# Patient Record
Sex: MALE | Race: WHITE | NOT HISPANIC OR LATINO | Employment: FULL TIME | URBAN - METROPOLITAN AREA
[De-identification: names, ages, dates, MRNs, and addresses within clinical notes are randomized per-mention and may not be internally consistent; named-entity substitution may affect disease eponyms.]

---

## 2018-05-21 ENCOUNTER — RECORDS - HEALTHEAST (OUTPATIENT)
Dept: LAB | Facility: CLINIC | Age: 43
End: 2018-05-21

## 2018-05-21 LAB
ALBUMIN SERPL-MCNC: 3.8 G/DL (ref 3.5–5)
ALT SERPL W P-5'-P-CCNC: 39 U/L (ref 0–45)
AST SERPL W P-5'-P-CCNC: 40 U/L (ref 0–40)
C REACTIVE PROTEIN LHE: <0.1 MG/DL (ref 0–0.8)
CREAT SERPL-MCNC: 0.69 MG/DL (ref 0.7–1.3)
ERYTHROCYTE [DISTWIDTH] IN BLOOD BY AUTOMATED COUNT: 12.8 % (ref 11–14.5)
ERYTHROCYTE [SEDIMENTATION RATE] IN BLOOD BY WESTERGREN METHOD: 5 MM/HR (ref 0–15)
GFR SERPL CREATININE-BSD FRML MDRD: >60 ML/MIN/1.73M2
HCT VFR BLD AUTO: 41.5 % (ref 40–54)
HGB BLD-MCNC: 14.2 G/DL (ref 14–18)
MCH RBC QN AUTO: 32.7 PG (ref 27–34)
MCHC RBC AUTO-ENTMCNC: 34.2 G/DL (ref 32–36)
MCV RBC AUTO: 96 FL (ref 80–100)
PLATELET # BLD AUTO: 183 THOU/UL (ref 140–440)
PMV BLD AUTO: 10.1 FL (ref 8.5–12.5)
RBC # BLD AUTO: 4.34 MILL/UL (ref 4.4–6.2)
URATE SERPL-MCNC: 6.9 MG/DL (ref 3–8)
WBC: 5.9 THOU/UL (ref 4–11)

## 2018-05-22 ENCOUNTER — RECORDS - HEALTHEAST (OUTPATIENT)
Dept: ADMINISTRATIVE | Facility: OTHER | Age: 43
End: 2018-05-22

## 2021-09-20 ENCOUNTER — OFFICE VISIT (OUTPATIENT)
Dept: FAMILY MEDICINE | Facility: CLINIC | Age: 46
End: 2021-09-20

## 2021-09-20 VITALS
HEART RATE: 75 BPM | OXYGEN SATURATION: 97 % | DIASTOLIC BLOOD PRESSURE: 88 MMHG | SYSTOLIC BLOOD PRESSURE: 124 MMHG | HEIGHT: 78 IN | WEIGHT: 254 LBS | RESPIRATION RATE: 22 BRPM | BODY MASS INDEX: 29.39 KG/M2

## 2021-09-20 DIAGNOSIS — M19.011 PRIMARY OSTEOARTHRITIS OF RIGHT SHOULDER: ICD-10-CM

## 2021-09-20 DIAGNOSIS — Z23 NEED FOR VACCINATION: ICD-10-CM

## 2021-09-20 DIAGNOSIS — Z11.59 ENCOUNTER FOR HEPATITIS C SCREENING TEST FOR LOW RISK PATIENT: ICD-10-CM

## 2021-09-20 DIAGNOSIS — Z00.00 ROUTINE GENERAL MEDICAL EXAMINATION AT A HEALTH CARE FACILITY: Primary | ICD-10-CM

## 2021-09-20 DIAGNOSIS — Z11.4 SCREENING FOR HIV WITHOUT PRESENCE OF RISK FACTORS: ICD-10-CM

## 2021-09-20 DIAGNOSIS — L70.0 ACNE VULGARIS: ICD-10-CM

## 2021-09-20 LAB
CHOLEST SERPL-MCNC: 171 MG/DL (ref 0–199)
CHOLEST/HDLC SERPL: 3 {RATIO} (ref 0–5)
GLUCOSE SERPL-MCNC: 91 MG/DL (ref 60–99)
HDLC SERPL-MCNC: 59 MG/DL (ref 40–150)
LDLC SERPL CALC-MCNC: 97 MG/DL (ref 0–130)
TRIGL SERPL-MCNC: 75 MG/DL (ref 0–149)

## 2021-09-20 PROCEDURE — 90471 IMMUNIZATION ADMIN: CPT | Performed by: STUDENT IN AN ORGANIZED HEALTH CARE EDUCATION/TRAINING PROGRAM

## 2021-09-20 PROCEDURE — 86803 HEPATITIS C AB TEST: CPT | Mod: 90 | Performed by: STUDENT IN AN ORGANIZED HEALTH CARE EDUCATION/TRAINING PROGRAM

## 2021-09-20 PROCEDURE — 82947 ASSAY GLUCOSE BLOOD QUANT: CPT | Performed by: STUDENT IN AN ORGANIZED HEALTH CARE EDUCATION/TRAINING PROGRAM

## 2021-09-20 PROCEDURE — 99386 PREV VISIT NEW AGE 40-64: CPT | Mod: 25 | Performed by: STUDENT IN AN ORGANIZED HEALTH CARE EDUCATION/TRAINING PROGRAM

## 2021-09-20 PROCEDURE — 87389 HIV-1 AG W/HIV-1&-2 AB AG IA: CPT | Mod: 90 | Performed by: STUDENT IN AN ORGANIZED HEALTH CARE EDUCATION/TRAINING PROGRAM

## 2021-09-20 PROCEDURE — 36415 COLL VENOUS BLD VENIPUNCTURE: CPT | Performed by: STUDENT IN AN ORGANIZED HEALTH CARE EDUCATION/TRAINING PROGRAM

## 2021-09-20 PROCEDURE — 80061 LIPID PANEL: CPT | Performed by: STUDENT IN AN ORGANIZED HEALTH CARE EDUCATION/TRAINING PROGRAM

## 2021-09-20 PROCEDURE — 90686 IIV4 VACC NO PRSV 0.5 ML IM: CPT | Performed by: STUDENT IN AN ORGANIZED HEALTH CARE EDUCATION/TRAINING PROGRAM

## 2021-09-20 RX ORDER — AMOXICILLIN 500 MG/1
CAPSULE ORAL
Qty: 60 CAPSULE | Refills: 3 | COMMUNITY
Start: 2021-09-20 | End: 2022-08-10

## 2021-09-20 RX ORDER — AMOXICILLIN 500 MG/1
CAPSULE ORAL
COMMUNITY
Start: 2021-05-03 | End: 2021-09-20

## 2021-09-20 ASSESSMENT — ANXIETY QUESTIONNAIRES
6. BECOMING EASILY ANNOYED OR IRRITABLE: NOT AT ALL
GAD7 TOTAL SCORE: 5
7. FEELING AFRAID AS IF SOMETHING AWFUL MIGHT HAPPEN: NOT AT ALL
5. BEING SO RESTLESS THAT IT IS HARD TO SIT STILL: SEVERAL DAYS
3. WORRYING TOO MUCH ABOUT DIFFERENT THINGS: SEVERAL DAYS
2. NOT BEING ABLE TO STOP OR CONTROL WORRYING: SEVERAL DAYS
1. FEELING NERVOUS, ANXIOUS, OR ON EDGE: SEVERAL DAYS
IF YOU CHECKED OFF ANY PROBLEMS ON THIS QUESTIONNAIRE, HOW DIFFICULT HAVE THESE PROBLEMS MADE IT FOR YOU TO DO YOUR WORK, TAKE CARE OF THINGS AT HOME, OR GET ALONG WITH OTHER PEOPLE: NOT DIFFICULT AT ALL

## 2021-09-20 ASSESSMENT — PATIENT HEALTH QUESTIONNAIRE - PHQ9
SUM OF ALL RESPONSES TO PHQ QUESTIONS 1-9: 6
5. POOR APPETITE OR OVEREATING: SEVERAL DAYS

## 2021-09-20 ASSESSMENT — MIFFLIN-ST. JEOR: SCORE: 2208.02

## 2021-09-20 NOTE — NURSING NOTE
Chief Complaint   Patient presents with     Physical     fasting, no concerns     Pre-Visit Screening:  Immunizations: Due for flu   Colonoscopy:   Mammogram:  Asthma Action Test/Plan:  PHQ9: done today  GAD7: done today  Questioned patient about current smoking habits Pt. never  OK to leave a detailed message on voice mail for today's visit YES, phone # [phone]  ACP discussed and given

## 2021-09-20 NOTE — PROGRESS NOTES
"  SUBJECTIVE:   CC: Francesco Ramos is an 45 year old male who presents for preventative health visit.       HPI  Good health overall  Less exercise than usual, looking forward to gym/basketball  Healthy diet  No longer taking allopurinol, no foot pain in years  No MH concerns    Today's PHQ-2 Score:   PHQ-2 ( 1999 Pfizer) 12/19/2013   Q1: Little interest or pleasure in doing things 0   Q2: Feeling down, depressed or hopeless 0   PHQ-2 Score 0       Social History     Tobacco Use     Smoking status: Never Smoker     Smokeless tobacco: Never Used   Substance Use Topics     Alcohol use: Yes     Alcohol/week: 14.0 standard drinks     Types: 14 Standard drinks or equivalent per week     Owns brewery, mindful of ETOH    Last PSA: No results found for: PSA    Reviewed orders with patient. Reviewed health maintenance and updated orders accordingly - Yes    Reviewed and updated as needed this visit by clinical staff  Tobacco  Allergies  Meds             Reviewed and updated as needed this visit by Provider                 Review of Systems  12 point ROS performed and negative for new concerns except as mentioned above     OBJECTIVE:   /88 (BP Location: Right arm, Patient Position: Sitting, Cuff Size: Adult Regular)   Pulse 75   Resp 22   Ht 2.057 m (6' 9\")   Wt 115.2 kg (254 lb)   SpO2 97%   BMI 27.22 kg/m      Physical Exam  GENERAL: healthy, alert and no distress  EYES: Eyes grossly normal to inspection, PERRL and conjunctivae and sclerae normal  HENT: ear canals and TM's normal, nose and mouth without ulcers or lesions  NECK: no adenopathy, no asymmetry, masses, or scars and thyroid normal to palpation  RESP: lungs clear to auscultation - no rales, rhonchi or wheezes  CV: regular rate and rhythm, normal S1 S2, no S3 or S4, no murmur, click or rub, no peripheral edema and peripheral pulses strong  ABDOMEN: soft, nontender, no hepatosplenomegaly, no masses and bowel sounds normal  MS: no gross " "musculoskeletal defects noted, no edema  SKIN: no suspicious lesions or rashes  NEURO: Normal strength and tone, mentation intact and speech normal  PSYCH: mentation appears normal, affect normal/bright    Diagnostic Test Results:  Labs reviewed in Epic    ASSESSMENT/PLAN:       ICD-10-CM    1. Routine general medical examination at a health care facility  Z00.00 Lipid Panel (BFP)     Glucose Fasting (BFP)   2. Need for vaccination  Z23 FLU VAC PRESRV FREE QUAD SPLIT VIR 3+YRS IM   3. Primary osteoarthritis of right shoulder  M19.011    4. Encounter for hepatitis C screening test for low risk patient  Z11.59 HEPATITIS C ANTIBODY ([facility])   5. Screening for HIV without presence of risk factors  Z11.4 HIV 1/2 Agn Arminda 4th Gen w Reflex ([facility])   6. Acne vulgaris  L70.0 amoxicillin (AMOXIL) 500 MG capsule       Patient has been advised of split billing requirements and indicates understanding: Yes  COUNSELING:   Reviewed preventive health counseling, and as reflected above       Regular exercise       Healthy diet/nutrition       Vision screening       Hearing screening       Immunizations       Alcohol Use        Safe sex practices/STD prevention       Colon cancer screening       Prostate cancer screening       Advance Care Planning    Estimated body mass index is 27.22 kg/m  as calculated from the following:    Height as of this encounter: 2.057 m (6' 9\").    Weight as of this encounter: 115.2 kg (254 lb).     He reports that he has never smoked. He has never used smokeless tobacco.      Anthony Ca MD, [facility]   "

## 2021-09-21 ASSESSMENT — ANXIETY QUESTIONNAIRES: GAD7 TOTAL SCORE: 5

## 2021-09-22 LAB
HCV AB - QUEST: NORMAL
HIV 1/2 AGN ABY 4TH GEN WITH REFLEX: NORMAL
SIGNAL TO CUT OFF - QUEST: 0.04

## 2022-05-13 ENCOUNTER — TRANSFERRED RECORDS (OUTPATIENT)
Dept: FAMILY MEDICINE | Facility: CLINIC | Age: 47
End: 2022-05-13

## 2022-08-10 DIAGNOSIS — L70.0 ACNE VULGARIS: ICD-10-CM

## 2022-08-10 RX ORDER — AMOXICILLIN 500 MG/1
CAPSULE ORAL
Qty: 60 CAPSULE | Refills: 0 | COMMUNITY
Start: 2022-08-10 | End: 2022-09-21

## 2022-08-10 NOTE — TELEPHONE ENCOUNTER
Pt is requesting a one month supply of amoxicillin to get him through until his cpx scheduled 2022.  I called in a one month supply to [facility].      Signed Prescriptions:                        Disp   Refills    amoxicillin (AMOXIL) 500 MG capsule        60 cap*0        Simg BID x 1 month, every 3rd month  Authorizing Provider: TIARA DUKES  Ordering User: MONICA DELGADILLO

## 2022-09-21 ENCOUNTER — OFFICE VISIT (OUTPATIENT)
Dept: FAMILY MEDICINE | Facility: CLINIC | Age: 47
End: 2022-09-21

## 2022-09-21 VITALS
DIASTOLIC BLOOD PRESSURE: 94 MMHG | OXYGEN SATURATION: 98 % | HEART RATE: 74 BPM | TEMPERATURE: 97.6 F | SYSTOLIC BLOOD PRESSURE: 128 MMHG | RESPIRATION RATE: 22 BRPM | BODY MASS INDEX: 30.2 KG/M2 | HEIGHT: 78 IN | WEIGHT: 261 LBS

## 2022-09-21 DIAGNOSIS — G40.909 NONINTRACTABLE EPILEPSY WITHOUT STATUS EPILEPTICUS, UNSPECIFIED EPILEPSY TYPE (H): ICD-10-CM

## 2022-09-21 DIAGNOSIS — Z23 NEED FOR VACCINATION: ICD-10-CM

## 2022-09-21 DIAGNOSIS — Z00.00 ROUTINE GENERAL MEDICAL EXAMINATION AT A HEALTH CARE FACILITY: Primary | ICD-10-CM

## 2022-09-21 DIAGNOSIS — R03.0 DIASTOLIC BLOOD PRESSURE 90 MM HG OR HIGHER: ICD-10-CM

## 2022-09-21 DIAGNOSIS — L70.0 ACNE VULGARIS: ICD-10-CM

## 2022-09-21 DIAGNOSIS — Z13.220 LIPID SCREENING: ICD-10-CM

## 2022-09-21 DIAGNOSIS — Z12.11 SPECIAL SCREENING FOR MALIGNANT NEOPLASMS, COLON: ICD-10-CM

## 2022-09-21 DIAGNOSIS — Z13.1 SCREENING FOR DIABETES MELLITUS: ICD-10-CM

## 2022-09-21 LAB
ALBUMIN SERPL-MCNC: 4.3 G/DL (ref 3.6–5.1)
ALBUMIN/GLOB SERPL: 1.5 {RATIO} (ref 1–2.5)
ALP SERPL-CCNC: 43 U/L (ref 33–130)
ALT 1742-6: 22 U/L (ref 0–32)
AST 1920-8: 16 U/L (ref 0–35)
BILIRUB SERPL-MCNC: 0.5 MG/DL (ref 0.2–1.2)
BUN SERPL-MCNC: 9 MG/DL (ref 7–25)
BUN/CREATININE RATIO: 11.5 (ref 6–22)
CALCIUM SERPL-MCNC: 9 MG/DL (ref 8.6–10.3)
CHLORIDE SERPLBLD-SCNC: 105.9 MMOL/L (ref 98–110)
CHOLEST SERPL-MCNC: 144 MG/DL (ref 0–199)
CHOLEST/HDLC SERPL: 2 {RATIO} (ref 0–5)
CO2 SERPL-SCNC: 26.6 MMOL/L (ref 20–32)
CREAT SERPL-MCNC: 0.78 MG/DL (ref 0.6–1.3)
GLOBULIN, CALCULATED - QUEST: 2.8 (ref 1.9–3.7)
GLUCOSE SERPL-MCNC: 84 MG/DL (ref 60–99)
HBA1C MFR BLD: 5 % (ref 4–7)
HDLC SERPL-MCNC: 58 MG/DL (ref 40–150)
LDLC SERPL CALC-MCNC: 73 MG/DL (ref 0–130)
POTASSIUM SERPL-SCNC: 4.88 MMOL/L (ref 3.5–5.3)
PROT SERPL-MCNC: 7.1 G/DL (ref 6.1–8.1)
SODIUM SERPL-SCNC: 140.8 MMOL/L (ref 135–146)
TRIGL SERPL-MCNC: 63 MG/DL (ref 0–149)

## 2022-09-21 PROCEDURE — 99396 PREV VISIT EST AGE 40-64: CPT | Mod: 25 | Performed by: STUDENT IN AN ORGANIZED HEALTH CARE EDUCATION/TRAINING PROGRAM

## 2022-09-21 PROCEDURE — 80061 LIPID PANEL: CPT | Performed by: STUDENT IN AN ORGANIZED HEALTH CARE EDUCATION/TRAINING PROGRAM

## 2022-09-21 PROCEDURE — 90471 IMMUNIZATION ADMIN: CPT | Performed by: STUDENT IN AN ORGANIZED HEALTH CARE EDUCATION/TRAINING PROGRAM

## 2022-09-21 PROCEDURE — 84443 ASSAY THYROID STIM HORMONE: CPT | Mod: 90 | Performed by: STUDENT IN AN ORGANIZED HEALTH CARE EDUCATION/TRAINING PROGRAM

## 2022-09-21 PROCEDURE — 80053 COMPREHEN METABOLIC PANEL: CPT | Performed by: STUDENT IN AN ORGANIZED HEALTH CARE EDUCATION/TRAINING PROGRAM

## 2022-09-21 PROCEDURE — 83036 HEMOGLOBIN GLYCOSYLATED A1C: CPT | Performed by: STUDENT IN AN ORGANIZED HEALTH CARE EDUCATION/TRAINING PROGRAM

## 2022-09-21 PROCEDURE — 90686 IIV4 VACC NO PRSV 0.5 ML IM: CPT | Performed by: STUDENT IN AN ORGANIZED HEALTH CARE EDUCATION/TRAINING PROGRAM

## 2022-09-21 RX ORDER — AMOXICILLIN 500 MG/1
CAPSULE ORAL
Qty: 60 CAPSULE | Refills: 3 | Status: SHIPPED | OUTPATIENT
Start: 2022-09-21 | End: 2023-12-11

## 2022-09-21 NOTE — NURSING NOTE
Chief Complaint   Patient presents with     Physical     Annual, fasting      Pre-visit Screening:  Immunizations:  up to date  Colonoscopy:  is due and ordered today  Mammogram: NA  Asthma Action Test/Plan:  NA  PHQ9:  PHQ2 done today   GAD7:  No concerns  Questioned patient about current smoking habits Pt. has never smoked.  Ok to leave detailed message on voice mail for today's visit only Yes, phone # [phone]

## 2022-09-21 NOTE — PROGRESS NOTES
3  SUBJECTIVE:   CC: Francesco Ramos is an 46 year old male who presents for preventive health visit.     Patient has been advised of split billing requirements and indicates understanding: Yes  Healthy Habits:  General health: pretty good  Diet: good  Exercise: active  Sleep: no concerns     Mental Health: no concerns        Problems taking medications regularly No    Medication side effects: No    Have you had an eye exam in the past two years? no    Do you see a dentist twice per year? yes    Today's PHQ-2 Score:   PHQ-2 ( 1999 Pfizer) 9/21/2022 12/19/2013   Q1: Little interest or pleasure in doing things 0 0   Q2: Feeling down, depressed or hopeless 0 0   PHQ-2 Score 0 0     Do you feel safe in your environment? Yes      Social History     Tobacco Use     Smoking status: Never Smoker     Smokeless tobacco: Never Used   Substance Use Topics     Alcohol use: Yes     Alcohol/week: 14.0 standard drinks     Types: 14 Standard drinks or equivalent per week     Comment: 14 beers a week                         Last PSA: No results found for: PSA    Reviewed orders with patient. Reviewed health maintenance and updated orders accordingly - Yes  Labs reviewed in EPIC  BP Readings from Last 3 Encounters:   09/21/22 (!) 128/94   09/20/21 124/88   03/19/15 (!) 148/94    Wt Readings from Last 3 Encounters:   09/21/22 118.4 kg (261 lb)   09/20/21 115.2 kg (254 lb)   03/19/15 123.3 kg (271 lb 12.8 oz)            Reviewed and updated as needed this visit by clinical staff   Tobacco  Allergies  Meds              Current Outpatient Medications   Medication     amoxicillin (AMOXIL) 500 MG capsule     GLUCOSAMINE CHONDR 500 COMPLEX OR CAPS     KEPPRA 500 MG OR TABS     MULTI-VITAMIN OR TABS     No current facility-administered medications for this visit.       Reviewed and updated as needed this visit by Provider                   Past Medical History:   Diagnosis Date     Chronic idiopathic gout of foot     questionable  "diagnosis?     Epilepsy seizure, generalized, convulsive (H) 01/01/2003      Past Surgical History:   Procedure Laterality Date     ANKLE SURGERY Left     lateral ligament reconstruction     Family History   Problem Relation Age of Onset     Skin Cancer Father      Cerebrovascular Disease Maternal Grandfather      Thyroid Disease No family hx of      Prostate Cancer No family hx of      Mental Illness No family hx of      Diabetes No family hx of      Coronary Artery Disease No family hx of      Colon Cancer No family hx of      Breast Cancer No family hx of         ROS:  12 point ROS performed and negative for new concerns except as mentioned above     OBJECTIVE:   BP (!) 128/94 (BP Location: Right arm, Patient Position: Sitting, Cuff Size: Adult Large)   Pulse 74   Temp 97.6  F (36.4  C) (Temporal)   Resp 22   Ht 2.057 m (6' 9\")   Wt 118.4 kg (261 lb)   SpO2 98%   BMI 27.97 kg/m    EXAM:  GENERAL: healthy, alert and no distress  EYES: Eyes grossly normal to inspection, PERRL and conjunctivae and sclerae normal  HENT: ear canals and TM's normal, nose and mouth without ulcers or lesions  NECK: no adenopathy, no asymmetry, masses, or scars and thyroid normal to palpation  RESP: lungs clear to auscultation - no rales, rhonchi or wheezes  CV: regular rate and rhythm, normal S1 S2, no S3 or S4, no murmur, click or rub, no peripheral edema and peripheral pulses strong  ABDOMEN: soft, nontender, no hepatosplenomegaly, no masses and bowel sounds normal  MS: no gross musculoskeletal defects noted, no edema  SKIN: no suspicious lesions or rashes  NEURO: Normal strength and tone, mentation intact and speech normal  PSYCH: mentation appears normal, affect normal/bright    Diagnostic Test Results:  Labs reviewed in Epic    ASSESSMENT/PLAN:       ICD-10-CM    1. Routine general medical examination at a health care facility  Z00.00    2. Special screening for malignant neoplasms, colon  Z12.11 Colonoscopy Screening " " Referral     CANCELED: Colonoscopy Screening  Referral   3. Diastolic blood pressure 90 mm Hg or higher  R03.0 Comprehensive Metobolic Panel (BFP)     TSH WITH FREE T4 REFLEX ([facility])   4. Nonintractable epilepsy without status epilepticus, unspecified epilepsy type (H)  G40.909 Comprehensive Metobolic Panel (BFP)   5. Lipid screening  Z13.220 Lipid Panel (BFP)   6. Screening for diabetes mellitus  Z13.1 HEMOGLOBIN A1C (BFP)   7. Acne vulgaris  L70.0 amoxicillin (AMOXIL) 500 MG capsule   8. Need for vaccination  Z23 FLU VAC PRESRV FREE QUAD SPLIT VIR 3+YRS IM     Patient has been advised of split billing requirements and indicates understanding: Yes     COUNSELING:  Reviewed preventive health counseling, as reflected in patient instructions       Regular exercise       Healthy diet/nutrition       Vision screening       Hearing screening       Immunizations       Alcohol Use        Colorectal cancer screening       Prostate cancer screening       Advance Care Planning    Estimated body mass index is 27.97 kg/m  as calculated from the following:    Height as of this encounter: 2.057 m (6' 9\").    Weight as of this encounter: 118.4 kg (261 lb).    Weight management plan: Discussed healthy diet and exercise guidelines    He reports that he has never smoked. He has never used smokeless tobacco.    Patient Instructions   Labs and flu shot today    Schedule eye exam - look at insurance network coverage    Schedule colonoscopy at [facility] (usually best covered if their doctor at [facility] facility)    Consider omicron covid booster    Consider getting BP cuff at pharmacy and measuring at different times of day     Anthony Ca MD, [facility]   "

## 2022-09-21 NOTE — PATIENT INSTRUCTIONS
Labs and flu shot today    Schedule eye exam - look at insurance network coverage    Schedule colonoscopy at [facility] (usually best covered if their doctor at [facility] facility)    Consider omicron covid booster    Consider getting BP cuff at pharmacy and measuring at different times of day

## 2022-09-22 LAB — TSH SERPL-ACNC: 1.51 MIU/L (ref 0.4–4.5)

## 2022-10-05 ENCOUNTER — TRANSFERRED RECORDS (OUTPATIENT)
Dept: FAMILY MEDICINE | Facility: CLINIC | Age: 47
End: 2022-10-05

## 2023-03-13 ENCOUNTER — TRANSFERRED RECORDS (OUTPATIENT)
Dept: FAMILY MEDICINE | Facility: CLINIC | Age: 48
End: 2023-03-13

## 2023-03-20 ENCOUNTER — TRANSFERRED RECORDS (OUTPATIENT)
Dept: FAMILY MEDICINE | Facility: CLINIC | Age: 48
End: 2023-03-20

## 2023-05-30 ENCOUNTER — TRANSFERRED RECORDS (OUTPATIENT)
Dept: FAMILY MEDICINE | Facility: CLINIC | Age: 48
End: 2023-05-30

## 2023-06-06 ENCOUNTER — OFFICE VISIT (OUTPATIENT)
Dept: FAMILY MEDICINE | Facility: CLINIC | Age: 48
End: 2023-06-06

## 2023-06-06 VITALS
HEIGHT: 78 IN | OXYGEN SATURATION: 98 % | WEIGHT: 264 LBS | DIASTOLIC BLOOD PRESSURE: 84 MMHG | HEART RATE: 68 BPM | TEMPERATURE: 98.6 F | SYSTOLIC BLOOD PRESSURE: 130 MMHG | BODY MASS INDEX: 30.55 KG/M2

## 2023-06-06 DIAGNOSIS — Z71.89 ACP (ADVANCE CARE PLANNING): ICD-10-CM

## 2023-06-06 DIAGNOSIS — Z76.89 HEALTH CARE HOME: ICD-10-CM

## 2023-06-06 DIAGNOSIS — Z01.818 PRE-OP EXAM: Primary | ICD-10-CM

## 2023-06-06 DIAGNOSIS — S46.002A ROTATOR CUFF INJURY, LEFT, INITIAL ENCOUNTER: ICD-10-CM

## 2023-06-06 PROBLEM — G40.209 COMPLEX PARTIAL EPILEPTIC SEIZURE (H): Status: ACTIVE | Noted: 2023-06-06

## 2023-06-06 PROCEDURE — 99214 OFFICE O/P EST MOD 30 MIN: CPT | Performed by: PHYSICIAN ASSISTANT

## 2023-06-06 NOTE — NURSING NOTE
Chief Complaint   Patient presents with     Pre-Op Exam     Surgery/Procedure: Right shoulder arthroscopy   Surgery Location: [facility]   Surgeon: Dr. Saunders  Surgery Date: 06/15/23

## 2023-06-06 NOTE — PROGRESS NOTES
[facility]  [address] [place] [address]  [address]  [facility] [address]  Phone: [phone]  Fax: [phone]  Primary Provider: Anthony Ca  Pre-op Performing Provider: JONO TOBIN      PREOPERATIVE EVALUATION:  Today's date: 2023    Francesco Ramos is a 47 year old male who presents for a preoperative evaluation. ( 71)    Surgical Information:  Surgery/Procedure: Right shoulder arthroscopy   Surgery Location: [facility]   Surgeon: Dr. Saunders  Surgery Date: 06/15/23  Time of Surgery: TBD  Where patient plans to recover: At home with family  Fax number for surgical facility:     Assessment & Plan     The proposed surgical procedure is considered INTERMEDIATE risk.    ACP (advance care planning)      Health Care Home      Pre-op exam  Proceed with surgery at surgeon's discretion.      Rotator cuff injury, left, initial encounter        Risks and Recommendations:  The patient has the following additional risks and recommendations for perioperative complications:   - No identified additional risk factors other than previously addressed    Antiplatelet or Anticoagulation Medication Instructions:   - Patient is on no antiplatelet or anticoagulation medications.    Additional Medication Instructions:  Pt will contact neurology to confirm no change to dosing. No other meds    RECOMMENDATION:  APPROVAL GIVEN to proceed with proposed procedure, without further diagnostic evaluation.      Subjective       HPI related to upcoming procedure: L shoulder injury, arthritis and rotator cuff injury    1. No - Have you ever had a heart attack or stroke?  2. No - Have you ever had surgery on your heart or blood vessels, such as a stent, coronary (heart) bypass, or surgery on an artery in the head, neck, heart, or legs?  3. No - Do you have chest pain when you are physically active?  4. No - Do you have a history of heart failure?  5. No - Do you currently have a cold,  bronchitis, or symptoms of other respiratory (head and chest) infections?  6. No - Do you have a cough, shortness of breath, or wheezing?  7. Yes - Do you or anyone in your family have a history of blood clots? Brother had blood clot-DVT  8. No - Do you or anyone in your family have a serious bleeding problem, such as long-lasting bleeding after surgeries or cuts?  9. No - Have you ever had anemia or been told to take iron pills?  10. No - Have you had any abnormal blood loss such as black, tarry or bloody stools, or abnormal vaginal bleeding?  11. No - Have you ever had a blood transfusion?  12. Yes - Are you willing to have a blood transfusion if it is medically needed before, during, or after your surgery?  13. Yes - Have you or anyone in your family ever had problems with anesthesia (sedation for surgery)? Father did not react well, unknown specific reaction  14. No - Do you have sleep apnea, excessive snoring, or daytime drowsiness?   15. No - Do you have any artifical heart valves or other implanted medical devices, such as a pacemaker, defibrillator, or continuous glucose monitor?  16. No - Do you have any artifical joints?  17. No - Are you allergic to latex?    Health Care Directive:  Patient does not have a Health Care Directive or Living Will: Discussed advance care planning with patient; information given to patient to review.    Preoperative Review of :   reviewed - no record of controlled substances prescribed.      Status of Chronic Conditions:  Epilepsy: well controlled on Keppra. Last seizure 2007.    Review of Systems  CONSTITUTIONAL: NEGATIVE for fever, chills, change in weight  INTEGUMENTARY/SKIN: NEGATIVE for worrisome rashes, moles or lesions  EYES: NEGATIVE for vision changes or irritation  ENT/MOUTH: NEGATIVE for ear, mouth and throat problems  RESP: NEGATIVE for significant cough or SOB  CV: NEGATIVE for chest pain, palpitations or peripheral edema  GI: NEGATIVE for nausea, abdominal  pain, heartburn, or change in bowel habits  : NEGATIVE for frequency, dysuria, or hematuria  NEURO: NEGATIVE for weakness, dizziness or paresthesias  ENDOCRINE: NEGATIVE for temperature intolerance, skin/hair changes  HEME: NEGATIVE for bleeding problems  PSYCHIATRIC: NEGATIVE for changes in mood or affect    Patient Active Problem List    Diagnosis Date Noted     Complex partial epileptic seizure (H) 06/06/2023     Priority: Medium     Nonintractable epilepsy without status epilepticus, unspecified epilepsy type (H) 09/21/2022     Priority: Medium     CARDIOVASCULAR SCREENING; LDL GOAL LESS THAN 160 08/17/2011     Priority: Medium     Ankle joint pain 02/24/2011     Priority: Medium     Tibialis posterior tendonitis 02/24/2011     Priority: Medium     Peroneal tendon injury 02/24/2011     Priority: Medium     Enthesopathy of ankle and tarsus 02/24/2011     Priority: Medium     Problem list name updated by automated process. Provider to review       Pes planus 01/03/2011     Priority: Medium     Problem list name updated by automated process. Provider to review       ACP (advance care planning) 06/06/2023     Priority: Low     Health Care Home 06/06/2023     Priority: Low      Past Medical History:   Diagnosis Date     Chronic idiopathic gout of foot     questionable diagnosis?     Epilepsy seizure, generalized, convulsive (H) 01/01/2003     Past Surgical History:   Procedure Laterality Date     ANKLE SURGERY Left     lateral ligament reconstruction     Current Outpatient Medications   Medication Sig Dispense Refill     amoxicillin (AMOXIL) 500 MG capsule 500mg BID x 1 month, every 3rd month 60 capsule 3     KEPPRA 500 MG OR TABS 1 TABLET TWICE DAILY 60 0       Allergies   Allergen Reactions     Nkda [No Known Drug Allergy]         Social History     Tobacco Use     Smoking status: Never     Passive exposure: Never     Smokeless tobacco: Never   Vaping Use     Vaping status: Not on file   Substance Use Topics      "Alcohol use: Yes     Alcohol/week: 14.0 standard drinks of alcohol     Types: 14 Standard drinks or equivalent per week     Comment: 14 beers a week       History   Drug Use Unknown         Objective     /84 (BP Location: Right arm, Patient Position: Sitting, Cuff Size: Adult Large)   Pulse 68   Temp 98.6  F (37  C) (Temporal)   Ht 2.032 m (6' 8\")   Wt 119.7 kg (264 lb)   SpO2 98%   BMI 29.00 kg/m      Physical Exam    GENERAL APPEARANCE: healthy, alert and no distress     EYES: EOMI,  PERRL     HENT: ear canals and TM's normal and nose and mouth without ulcers or lesions     NECK: no adenopathy, no asymmetry, masses, or scars and thyroid normal to palpation     RESP: lungs clear to auscultation - no rales, rhonchi or wheezes     CV: regular rates and rhythm, normal S1 S2, no S3 or S4 and no murmur, click or rub     ABDOMEN:  soft, nontender, no HSM or masses and bowel sounds normal     MS: extremities normal- no gross deformities noted, no evidence of inflammation in joints, FROM in all extremities.     SKIN: no suspicious lesions or rashes     NEURO: Normal strength and tone, sensory exam grossly normal, mentation intact and speech normal     PSYCH: mentation appears normal. and affect normal/bright     LYMPHATICS: No cervical adenopathy    Recent Labs   Lab Test 09/21/22  1006 09/21/22  0000   NA  --  140.8   POTASSIUM  --  4.88   CR  --  0.78   A1C 5.0  --         Diagnostics:  No labs were ordered during this visit.   No EKG required, no history of coronary heart disease, significant arrhythmia, peripheral arterial disease or other structural heart disease.    Revised Cardiac Risk Index (RCRI):  The patient has the following serious cardiovascular risks for perioperative complications:   - No serious cardiac risks = 0 points     RCRI Interpretation: 0 points: Class I (very low risk - 0.4% complication rate)           Signed Electronically by: Carlin Her PA-C  Copy of this evaluation report is " provided to requesting physician.

## 2023-06-12 ENCOUNTER — TRANSFERRED RECORDS (OUTPATIENT)
Dept: FAMILY MEDICINE | Facility: CLINIC | Age: 48
End: 2023-06-12

## 2023-06-28 ENCOUNTER — TRANSFERRED RECORDS (OUTPATIENT)
Dept: FAMILY MEDICINE | Facility: CLINIC | Age: 48
End: 2023-06-28

## 2023-07-04 PROCEDURE — 12052 INTMD RPR FACE/MM 2.6-5.0 CM: CPT

## 2023-07-04 PROCEDURE — 99284 EMERGENCY DEPT VISIT MOD MDM: CPT | Mod: 25

## 2023-07-05 ENCOUNTER — APPOINTMENT (OUTPATIENT)
Dept: CT IMAGING | Facility: CLINIC | Age: 48
End: 2023-07-05
Attending: EMERGENCY MEDICINE
Payer: COMMERCIAL

## 2023-07-05 ENCOUNTER — HOSPITAL ENCOUNTER (EMERGENCY)
Facility: CLINIC | Age: 48
Discharge: HOME | End: 2023-07-05
Attending: EMERGENCY MEDICINE | Admitting: EMERGENCY MEDICINE
Payer: COMMERCIAL

## 2023-07-05 VITALS
OXYGEN SATURATION: 98 % | DIASTOLIC BLOOD PRESSURE: 77 MMHG | SYSTOLIC BLOOD PRESSURE: 108 MMHG | TEMPERATURE: 98.1 F | RESPIRATION RATE: 20 BRPM | HEART RATE: 106 BPM

## 2023-07-05 DIAGNOSIS — S02.2XXA CLOSED FRACTURE OF NASAL BONE, INITIAL ENCOUNTER: ICD-10-CM

## 2023-07-05 DIAGNOSIS — S01.81XA FACIAL LACERATION, INITIAL ENCOUNTER: ICD-10-CM

## 2023-07-05 PROCEDURE — 250N000009 HC RX 250: Performed by: EMERGENCY MEDICINE

## 2023-07-05 PROCEDURE — 70486 CT MAXILLOFACIAL W/O DYE: CPT

## 2023-07-05 PROCEDURE — 70450 CT HEAD/BRAIN W/O DYE: CPT

## 2023-07-05 RX ADMIN — Medication 3 ML: at 00:30

## 2023-07-05 RX ADMIN — Medication 3 ML: at 01:10

## 2023-07-05 ASSESSMENT — ACTIVITIES OF DAILY LIVING (ADL): ADLS_ACUITY_SCORE: 33

## 2023-07-05 NOTE — ED PROVIDER NOTES
History     Chief Complaint:  Fall       The history is provided by the patient.      Francesco Ramos is a 47 year old male who presents with a mechanical fall. About 30 minutes ago, he was taking out the trash when he tripped and hit his forehead on the ground. He did not lose consciousness at the time. He now has a large laceration to the right eye brow, and the bleeding was controlled in triage. At bedside, he endorses drinking heavily this past evening. He denies neck pain or back pain.     Independent Historian:    None    Review of External Notes:  I read the [facility] which reveals that his last Tetanus booster was 2018.      Medications:    Amoxil   Keppra     Past Medical History:    Chronic idiopathic gout   Epilepsy seizures     Past Surgical History:    Lateral ligament reconstruction     Physical Exam     Patient Vitals for the past 24 hrs:   BP Temp Temp src Pulse Resp SpO2   07/04/23 2359 108/77 98.1  F (36.7  C) Oral 106 20 98 %        Physical Exam  General: Appears well-developed and well-nourished.   Head: Laceration to right eyebrow and contusion to face.    Nose:  Nasal swelling present.  No bleeding or septal hematoma.   Mouth/Throat: Oropharynx is clear and moist.   Eyes: Conjunctivae are normal. Pupils are equal, round, and reactive to light.   Neck: Normal range of motion. No midline tenderness.  CV: Normal rate and regular rhythm.    Resp: Effort normal and breath sounds normal. No respiratory distress.   MSK: Normal range of motion. no edema. No Calf tenderness.  Neuro: The patient is alert and oriented. Speech normal.  Skin: Skin is warm and dry. No rash noted.   Psych: normal mood and affect. behavior is normal.       Emergency Department Course   Imaging:  Head CT w/o contrast  Final Result  IMPRESSION:  HEAD CT:  1.  No acute intracranial process.    FACIAL BONE CT:  1.  Comminuted nasal bone fractures with overlying soft tissue swelling  CT Facial Bones without Contrast  Final  Result  IMPRESSION:  HEAD CT:  1.  No acute intracranial process.  FACIAL BONE CT:  1.  Comminuted nasal bone fractures with overlying soft tissue swelling    Report per radiology    Procedures     Laceration Repair      LACERATION:  A simple clean 2.5 cm laceration.    LOCATION:  Right eye brow.    FUNCTION:  Distally sensation, circulation, motor and tendon function are intact.    ANESTHESIA:  LET - Topical.    PREPARATION:  Irrigation with Shur Clens.    DEBRIDEMENT:  no debridement.    CLOSURE:  Wound was closed with Two Layers: Subcutaneous layer closed with 2 x 5.0 Vicryl Sutures. Skin closed with 6 5.0 Fast Absorbing Gut using interrupted sutures.    Emergency Department Course & Assessments:    Interventions:  0030 LET 3 ml Topical   0110 LET 3 ml Topical     Assessments:  0009 I obtained history and examined the patient as noted above.  0143 I rechecked the patient and a laceration repair was performed as outlined in the procedure note above. The patient tolerated well and there were no complications. I discussed plan for discharge home.    Independent Interpretation (X-rays, CTs, rhythm strip):  On my independent interpretation of head CT there is no large ICH noted      Consultations/Discussion of Management or Tests:  None    Social Determinants of Health affecting care:  None     Disposition:  The patient was discharged to home.     Impression & Plan    Medical Decision Making:  Francesco Ramos presents after a fall.  He hit his face on the ground and denies an LOC.  He does endorse alcohol use tonight.  On evaluation he did have a fairly deep laceration to the right eyebrow.  CT was obtained that did show a nasal bone fracture.  No other fracture noted.  Laceration was cleaned and repaired and CT findings were discussed.  Pt discharged home with recommendation to monitor for signs of infection and discussed follow up with ENT.    Diagnosis:    ICD-10-CM    1. Facial laceration, initial encounter   S01.81XA       2. Closed fracture of nasal bone, initial encounter  S02.2XXA            Scribe Disclosure:  I, Ren Arnold, am serving as a scribe at 12:20 AM on 7/5/2023 to document services personally performed by Rush Hernandez MD based on my observations and the provider's statements to me.  7/5/2023   Rush Hernandez MD Bergenstal, John A, MD  07/08/23 1454

## 2023-07-05 NOTE — ED TRIAGE NOTES
Patient coming in with large forehead gouge to right eyebrow. Patient states he tripped while taking out the garbage. Denies LOC, neck and back pain. Recent shoulder surgery. Patient states he has been drinking heavily this evening. Bleeding controlled in triage.     Triage Assessment       Row Name 07/05/23 0000       Triage Assessment (Adult)    Airway WDL WDL       Respiratory WDL    Respiratory WDL WDL       Skin Circulation/Temperature WDL    Skin Circulation/Temperature WDL WDL       Cardiac WDL    Cardiac WDL WDL       Peripheral/Neurovascular WDL    Peripheral Neurovascular WDL WDL       Cognitive/Neuro/Behavioral WDL    Cognitive/Neuro/Behavioral WDL WDL

## 2023-08-03 ENCOUNTER — TRANSFERRED RECORDS (OUTPATIENT)
Dept: FAMILY MEDICINE | Facility: CLINIC | Age: 48
End: 2023-08-03

## 2023-08-24 ENCOUNTER — TRANSFERRED RECORDS (OUTPATIENT)
Dept: FAMILY MEDICINE | Facility: CLINIC | Age: 48
End: 2023-08-24

## 2023-10-29 ENCOUNTER — HEALTH MAINTENANCE LETTER (OUTPATIENT)
Age: 48
End: 2023-10-29

## 2023-12-11 ENCOUNTER — TRANSFERRED RECORDS (OUTPATIENT)
Dept: FAMILY MEDICINE | Facility: CLINIC | Age: 48
End: 2023-12-11

## 2023-12-11 ENCOUNTER — OFFICE VISIT (OUTPATIENT)
Dept: FAMILY MEDICINE | Facility: CLINIC | Age: 48
End: 2023-12-11

## 2023-12-11 VITALS
SYSTOLIC BLOOD PRESSURE: 120 MMHG | RESPIRATION RATE: 20 BRPM | BODY MASS INDEX: 31.35 KG/M2 | WEIGHT: 271 LBS | HEIGHT: 78 IN | DIASTOLIC BLOOD PRESSURE: 90 MMHG | OXYGEN SATURATION: 97 % | HEART RATE: 84 BPM

## 2023-12-11 DIAGNOSIS — Z12.5 PROSTATE CANCER SCREENING: ICD-10-CM

## 2023-12-11 DIAGNOSIS — Z13.220 LIPID SCREENING: ICD-10-CM

## 2023-12-11 DIAGNOSIS — Z00.00 ROUTINE GENERAL MEDICAL EXAMINATION AT A HEALTH CARE FACILITY: ICD-10-CM

## 2023-12-11 DIAGNOSIS — Z00.00 ROUTINE PHYSICAL EXAMINATION: Primary | ICD-10-CM

## 2023-12-11 DIAGNOSIS — L70.0 ACNE VULGARIS: ICD-10-CM

## 2023-12-11 DIAGNOSIS — Z83.2 FAMILY HISTORY OF FACTOR V LEIDEN MUTATION: ICD-10-CM

## 2023-12-11 DIAGNOSIS — G40.909 NONINTRACTABLE EPILEPSY WITHOUT STATUS EPILEPTICUS, UNSPECIFIED EPILEPSY TYPE (H): ICD-10-CM

## 2023-12-11 DIAGNOSIS — Z13.1 SCREENING FOR DIABETES MELLITUS: ICD-10-CM

## 2023-12-11 DIAGNOSIS — Z23 NEED FOR VACCINATION: ICD-10-CM

## 2023-12-11 LAB
BUN SERPL-MCNC: 10 MG/DL (ref 7–25)
BUN/CREATININE RATIO: 11.8 (ref 6–32)
CALCIUM SERPL-MCNC: 9.5 MG/DL (ref 8.6–10.3)
CHLORIDE SERPLBLD-SCNC: 103.5 MMOL/L (ref 98–110)
CHOLEST SERPL-MCNC: 201 MG/DL (ref 0–199)
CHOLEST/HDLC SERPL: 4 {RATIO} (ref 0–5)
CO2 SERPL-SCNC: 24.4 MMOL/L (ref 20–32)
CREAT SERPL-MCNC: 0.85 MG/DL (ref 0.6–1.3)
GLUCOSE SERPL-MCNC: 86 MG/DL (ref 60–99)
HDLC SERPL-MCNC: 56 MG/DL (ref 40–150)
LDLC SERPL CALC-MCNC: 105 MG/DL (ref 0–130)
POTASSIUM SERPL-SCNC: 4.55 MMOL/L (ref 3.5–5.3)
SODIUM SERPL-SCNC: 138.2 MMOL/L (ref 135–146)
TRIGL SERPL-MCNC: 202 MG/DL (ref 0–149)

## 2023-12-11 PROCEDURE — 84153 ASSAY OF PSA TOTAL: CPT | Mod: 90 | Performed by: STUDENT IN AN ORGANIZED HEALTH CARE EDUCATION/TRAINING PROGRAM

## 2023-12-11 PROCEDURE — 90471 IMMUNIZATION ADMIN: CPT | Performed by: STUDENT IN AN ORGANIZED HEALTH CARE EDUCATION/TRAINING PROGRAM

## 2023-12-11 PROCEDURE — 36415 COLL VENOUS BLD VENIPUNCTURE: CPT | Performed by: STUDENT IN AN ORGANIZED HEALTH CARE EDUCATION/TRAINING PROGRAM

## 2023-12-11 PROCEDURE — 80048 BASIC METABOLIC PNL TOTAL CA: CPT | Performed by: STUDENT IN AN ORGANIZED HEALTH CARE EDUCATION/TRAINING PROGRAM

## 2023-12-11 PROCEDURE — 90686 IIV4 VACC NO PRSV 0.5 ML IM: CPT | Performed by: STUDENT IN AN ORGANIZED HEALTH CARE EDUCATION/TRAINING PROGRAM

## 2023-12-11 PROCEDURE — 80061 LIPID PANEL: CPT | Performed by: STUDENT IN AN ORGANIZED HEALTH CARE EDUCATION/TRAINING PROGRAM

## 2023-12-11 PROCEDURE — 99396 PREV VISIT EST AGE 40-64: CPT | Mod: 25 | Performed by: STUDENT IN AN ORGANIZED HEALTH CARE EDUCATION/TRAINING PROGRAM

## 2023-12-11 RX ORDER — AMOXICILLIN 500 MG/1
CAPSULE ORAL
Qty: 60 CAPSULE | Refills: 3 | Status: SHIPPED | OUTPATIENT
Start: 2023-12-11

## 2023-12-11 NOTE — PROGRESS NOTES
"  SUBJECTIVE:   CC: Francesco Ramos is an 48 year old male who presents for preventive health visit.     Patient has been advised of split billing requirements and indicates understanding: Yes    Nursing Notes:   Preeti Jiang CMA  12/11/2023 12:11 PM  Signed  Chief Complaint   Patient presents with    Physical     Annual, fasting      Pre-visit Screening:  Immunizations:  up to date-will get flu shot today  Colonoscopy:  is up to date  Mammogram: na  Asthma Action Test/Plan:  na  PHQ9:  PHQ2 done today   GAD7:  na  Questioned patient about current smoking habits Pt. has never smoked.  Ok to leave detailed message on voice mail for today's visit only yes, phone # [phone] (home)        Healthy Habits:  General health: doing well, shoulder surgery this year  Diet: good  Exercise: as shoulder recovers getting active again  Sleep: no concerns   Mental Health: no concerns      Problems taking medications regularly No  Medication side effects: No  Have you had an eye exam in the past two years? Needs to  Do you see a dentist twice per year? yes  Do you have sleep apnea, excessive snoring or daytime drowsiness?no      Today's PHQ-2 Score:       12/11/2023    12:09 PM 9/21/2022     9:13 AM   PHQ-2 ( 1999 [employer])   Q1: Little interest or pleasure in doing things 0 0   Q2: Feeling down, depressed or hopeless 0 0   PHQ-2 Score 0 0       Do you feel safe in your environment? Yes        Social History     Tobacco Use    Smoking status: Never     Passive exposure: Never    Smokeless tobacco: Never   Substance Use Topics    Alcohol use: Not Currently     Alcohol/week: 4.0 standard drinks of alcohol     Types: 4 Standard drinks or equivalent per week     Comment: 3 times per week                         Last PSA: No results found for: \"PSA\"    Reviewed orders with patient. Reviewed health maintenance and updated orders accordingly - Yes  Lab work is in process  Labs reviewed in EPIC  BP Readings from Last 3 Encounters: " "  12/11/23 (!) 120/90   07/04/23 108/77   06/06/23 130/84    Wt Readings from Last 3 Encounters:   12/11/23 122.9 kg (271 lb)   06/06/23 119.7 kg (264 lb)   09/21/22 118.4 kg (261 lb)                    Reviewed and updated as needed this visit by clinical staff   Tobacco  Allergies  Meds              Reviewed and updated as needed this visit by Provider                 Past Medical History:   Diagnosis Date    Chronic idiopathic gout of foot     questionable diagnosis?    Epilepsy seizure, generalized, convulsive (H) 01/01/2003      Past Surgical History:   Procedure Laterality Date    ANKLE SURGERY Left     lateral ligament reconstruction     Family History   Problem Relation Age of Onset    No Known Problems Mother     Osteoarthritis Father     Skin Cancer Father     Obesity Brother     Osteoarthritis Brother     Diabetes Brother     Hypertension Brother     Cerebrovascular Disease Brother     Heart Defect Brother         ASD closed after CVA    Cerebrovascular Disease Maternal Grandfather     Factor V Leiden deficiency Other     Thyroid Disease No family hx of     Prostate Cancer No family hx of     Mental Illness No family hx of     Coronary Artery Disease No family hx of     Colon Cancer No family hx of     Breast Cancer No family hx of        ROS:  12 point ROS performed and negative for new concerns except as mentioned above     OBJECTIVE:   BP (!) 120/90 (BP Location: Left arm, Patient Position: Sitting, Cuff Size: Adult Large)   Pulse 84   Resp 20   Ht 2.045 m (6' 8.5\")   Wt 122.9 kg (271 lb)   SpO2 97%   BMI 29.40 kg/m    EXAM:  GENERAL: healthy, alert and no distress  EYES: Eyes grossly normal to inspection, PERRL and conjunctivae and sclerae normal  HENT: ear canals and TM's normal, nose and mouth without ulcers or lesions  NECK: no adenopathy, no asymmetry, masses, or scars and thyroid normal to palpation  RESP: lungs clear to auscultation - no rales, rhonchi or wheezes  CV: regular rate and " "rhythm, normal S1 S2, no S3 or S4, no murmur, click or rub, no peripheral edema and peripheral pulses strong  ABDOMEN: soft, nontender, no hepatosplenomegaly, no masses and bowel sounds normal  MS: no gross musculoskeletal defects noted, no edema  SKIN: no suspicious lesions or rashes  NEURO: Normal strength and tone, mentation intact and speech normal  PSYCH: mentation appears normal, affect normal/bright    Diagnostic Test Results:  Labs reviewed in Epic    ASSESSMENT/PLAN:       ICD-10-CM    1. Routine physical examination  Z00.00       2. Acne vulgaris  L70.0 amoxicillin (AMOXIL) 500 MG capsule      3. Routine general medical examination at a health care facility  Z00.00       4. Screening for diabetes mellitus  Z13.1 Basic Metabolic Panel (BFP)      5. Lipid screening  Z13.220 Lipid Panel (BFP)      6. Need for vaccination  Z23       7. Nonintractable epilepsy without status epilepticus, unspecified epilepsy type (H)  G40.909       8. Family history of factor V Leiden mutation  Z83.2 Adult Oncology/Hematology  Referral - To a [facility] Location (Use POS/Location)      9. Prostate cancer screening  Z12.5 PSA Total ([facility])           Patient has been advised of split billing requirements and indicates understanding: Yes  COUNSELING:  Reviewed preventive health counseling, as reflected in patient instructions       Regular exercise       Healthy diet/nutrition       Vision screening       Immunizations       Alcohol Use        Colorectal cancer screening       Prostate cancer screening       Advance Care Planning    Estimated body mass index is 29.4 kg/m  as calculated from the following:    Height as of this encounter: 2.045 m (6' 8.5\").    Weight as of this encounter: 122.9 kg (271 lb).    Weight management plan: Discussed healthy diet and exercise guidelines    He reports that he has never smoked. He has never been exposed to tobacco smoke. He has never used smokeless tobacco.      Patient " Instructions   Blood work today    Eye exam good idea    Keep up with PT and getting back to gym     Hematology consult at [facility]  [address]  [place] [address] [phone] -- appt line    Keep monitoring BP at home, goal to be consistently < 135/85    Follow-up yearly, sooner if any concerns     Anthony Ca MD, [facility]

## 2023-12-11 NOTE — PATIENT INSTRUCTIONS
Blood work today    Eye exam good idea    Keep up with PT and getting back to gym     Hematology consult at [facility]  [address]  [place] [address] [phone] -- appt line    Keep monitoring BP at home, goal to be consistently < 135/85    Follow-up yearly, sooner if any concerns

## 2023-12-11 NOTE — NURSING NOTE
Chief Complaint   Patient presents with    Physical     Annual, fasting      Pre-visit Screening:  Immunizations:  up to date-will get flu shot today  Colonoscopy:  is up to date  Mammogram: bhakti  Asthma Action Test/Plan:  bhakti  PHQ9:  PHQ2 done today   GAD7:  na  Questioned patient about current smoking habits Pt. has never smoked.  Ok to leave detailed message on voice mail for today's visit only yes, phone # [phone] (home)

## 2023-12-12 LAB — ABBOTT PSA - QUEST: 0.77 NG/ML

## 2024-06-17 PROBLEM — Z76.89 HEALTH CARE HOME: Status: RESOLVED | Noted: 2023-06-06 | Resolved: 2024-06-17

## 2024-07-11 ENCOUNTER — TRANSFERRED RECORDS (OUTPATIENT)
Dept: FAMILY MEDICINE | Facility: CLINIC | Age: 49
End: 2024-07-11

## 2025-01-25 ENCOUNTER — HEALTH MAINTENANCE LETTER (OUTPATIENT)
Age: 50
End: 2025-01-25